# Patient Record
Sex: FEMALE | URBAN - METROPOLITAN AREA
[De-identification: names, ages, dates, MRNs, and addresses within clinical notes are randomized per-mention and may not be internally consistent; named-entity substitution may affect disease eponyms.]

---

## 2023-02-01 ENCOUNTER — LAB OUTSIDE AN ENCOUNTER (OUTPATIENT)
Dept: URBAN - METROPOLITAN AREA CLINIC 72 | Facility: CLINIC | Age: 78
End: 2023-02-01

## 2023-02-01 ENCOUNTER — WEB ENCOUNTER (OUTPATIENT)
Dept: URBAN - METROPOLITAN AREA CLINIC 72 | Facility: CLINIC | Age: 78
End: 2023-02-01

## 2023-02-01 ENCOUNTER — OFFICE VISIT (OUTPATIENT)
Dept: URBAN - METROPOLITAN AREA CLINIC 72 | Facility: CLINIC | Age: 78
End: 2023-02-01
Payer: MEDICARE

## 2023-02-01 ENCOUNTER — DASHBOARD ENCOUNTERS (OUTPATIENT)
Age: 78
End: 2023-02-01

## 2023-02-01 VITALS
SYSTOLIC BLOOD PRESSURE: 141 MMHG | BODY MASS INDEX: 31.58 KG/M2 | DIASTOLIC BLOOD PRESSURE: 78 MMHG | HEART RATE: 71 BPM | WEIGHT: 185 LBS | HEIGHT: 64 IN | TEMPERATURE: 97.9 F

## 2023-02-01 DIAGNOSIS — Z86.010 HISTORY OF COLON POLYPS: ICD-10-CM

## 2023-02-01 DIAGNOSIS — K57.90 DIVERTICULOSIS: ICD-10-CM

## 2023-02-01 DIAGNOSIS — K59.00 CONSTIPATION, UNSPECIFIED CONSTIPATION TYPE: ICD-10-CM

## 2023-02-01 PROBLEM — 398050005 DIVERTICULAR DISEASE OF COLON: Status: ACTIVE | Noted: 2023-02-01

## 2023-02-01 PROBLEM — 14760008: Status: ACTIVE | Noted: 2023-02-01

## 2023-02-01 PROCEDURE — 99203 OFFICE O/P NEW LOW 30 MIN: CPT | Performed by: INTERNAL MEDICINE

## 2023-02-01 RX ORDER — BLOOD SUGAR DIAGNOSTIC
USE TO CHECK BLOOD SUGAR ONCE DAILY STRIP MISCELLANEOUS
Qty: 50 EACH | Refills: 3 | Status: ACTIVE | COMMUNITY

## 2023-02-01 RX ORDER — LOSARTAN POTASSIUM 50 MG/1
TABLET, FILM COATED ORAL
Qty: 90 TABLET | Status: ACTIVE | COMMUNITY

## 2023-02-01 RX ORDER — FLUTICASONE PROPIONATE 50 UG/1
SPRAY, METERED NASAL
Qty: 48 GRAM | Status: ACTIVE | COMMUNITY

## 2023-02-01 RX ORDER — BLOOD-GLUCOSE METER
USE TO CHECK BLOOD SUGAR ONCE DAILY EACH MISCELLANEOUS
Qty: 1 EACH | Refills: 0 | Status: ACTIVE | COMMUNITY

## 2023-02-01 RX ORDER — OMEPRAZOLE 20 MG/1
CAPSULE, DELAYED RELEASE ORAL
Qty: 90 CAPSULE | Status: ACTIVE | COMMUNITY

## 2023-02-01 RX ORDER — POLYETHYLENE GLYCOL 3350 17 G/17G
1 PACKET MIXED WITH 8 OUNCES OF FLUID POWDER, FOR SOLUTION ORAL ONCE A DAY
Qty: 30 | OUTPATIENT
Start: 2023-02-01 | End: 2023-03-03

## 2023-02-01 RX ORDER — METFORMIN HYDROCHLORIDE 500 MG/1
TABLET, FILM COATED ORAL
Qty: 180 TABLET | Status: ACTIVE | COMMUNITY

## 2023-02-01 RX ORDER — LANCETS 33 GAUGE
USE TO CHECK BLOOD SUGAR DAILY EACH MISCELLANEOUS
Qty: 100 EACH | Refills: 0 | Status: ACTIVE | COMMUNITY

## 2023-02-01 RX ORDER — MOMETASONE FUROATE 50 UG/1
SPRAY, METERED NASAL
Qty: 51 GRAM | Status: ON HOLD | COMMUNITY

## 2023-02-01 NOTE — HPI-TODAY'S VISIT:
Mrs. Solano is a pleasant 77-year-old female presents as a new patient for consultation for colonoscopy due to history of colon polyps.  She was referred by Dr. Napoleon Ray, copy of this consultation will be forwarded to the referring physician.  She had colonoscopy 3/11/2020 with incomplete bowel prep repeat colonoscopy recommended 2023.  She had a 5 mm rectal polyp which was removed via snare, diverticulosis and external hemorrhoids.  In review of hospital records available, does not appear that any pathology was resulted from the colon polyp that was removed.  Her prep was suboptimal and a repeat was recommended in 2 to 3 years.  She suffers from chronic constipation.  She does fiber and prune juice with some improvement.  She reports excessive straining when she does have to go.  She has been told to take MiraLAX daily but does not take this.

## 2023-02-16 PROBLEM — 428283002 HISTORY OF POLYP OF COLON: Status: ACTIVE | Noted: 2023-02-01

## 2023-04-03 ENCOUNTER — TELEPHONE ENCOUNTER (OUTPATIENT)
Dept: URBAN - METROPOLITAN AREA CLINIC 72 | Facility: CLINIC | Age: 78
End: 2023-04-03

## 2023-04-03 ENCOUNTER — OFFICE VISIT (OUTPATIENT)
Dept: URBAN - METROPOLITAN AREA MEDICAL CENTER 40 | Facility: MEDICAL CENTER | Age: 78
End: 2023-04-03
Payer: MEDICARE

## 2023-04-03 DIAGNOSIS — D12.4 ADENOMA OF DESCENDING COLON: ICD-10-CM

## 2023-04-03 DIAGNOSIS — Z86.010 HISTORY OF COLON POLYPS: ICD-10-CM

## 2023-04-03 PROCEDURE — 45385 COLONOSCOPY W/LESION REMOVAL: CPT | Performed by: INTERNAL MEDICINE

## 2023-04-03 RX ORDER — METFORMIN HYDROCHLORIDE 500 MG/1
TABLET, FILM COATED ORAL
Qty: 180 TABLET | Status: ACTIVE | COMMUNITY

## 2023-04-03 RX ORDER — OMEPRAZOLE 20 MG/1
CAPSULE, DELAYED RELEASE ORAL
Qty: 90 CAPSULE | Status: ACTIVE | COMMUNITY

## 2023-04-03 RX ORDER — BLOOD SUGAR DIAGNOSTIC
USE TO CHECK BLOOD SUGAR ONCE DAILY STRIP MISCELLANEOUS
Qty: 50 EACH | Refills: 3 | Status: ACTIVE | COMMUNITY

## 2023-04-03 RX ORDER — LANCETS 33 GAUGE
USE TO CHECK BLOOD SUGAR DAILY EACH MISCELLANEOUS
Qty: 100 EACH | Refills: 0 | Status: ACTIVE | COMMUNITY

## 2023-04-03 RX ORDER — LOSARTAN POTASSIUM 50 MG/1
TABLET, FILM COATED ORAL
Qty: 90 TABLET | Status: ACTIVE | COMMUNITY

## 2023-04-03 RX ORDER — BLOOD-GLUCOSE METER
USE TO CHECK BLOOD SUGAR ONCE DAILY EACH MISCELLANEOUS
Qty: 1 EACH | Refills: 0 | Status: ACTIVE | COMMUNITY

## 2023-04-03 RX ORDER — FLUTICASONE PROPIONATE 50 UG/1
SPRAY, METERED NASAL
Qty: 48 GRAM | Status: ACTIVE | COMMUNITY

## 2023-04-03 RX ORDER — MOMETASONE FUROATE 50 UG/1
SPRAY, METERED NASAL
Qty: 51 GRAM | Status: ON HOLD | COMMUNITY

## 2023-04-04 ENCOUNTER — TELEPHONE ENCOUNTER (OUTPATIENT)
Dept: URBAN - METROPOLITAN AREA CLINIC 72 | Facility: CLINIC | Age: 78
End: 2023-04-04

## 2023-04-07 ENCOUNTER — OFFICE VISIT (OUTPATIENT)
Dept: URBAN - METROPOLITAN AREA MEDICAL CENTER 40 | Facility: MEDICAL CENTER | Age: 78
End: 2023-04-07
Payer: MEDICARE

## 2023-04-07 DIAGNOSIS — Z86.010 ADENOMAS PERSONAL HISTORY OF COLONIC POLYPS: ICD-10-CM

## 2023-04-07 DIAGNOSIS — Z09 CARDIOLOGY FOLLOW-UP ENCOUNTER: ICD-10-CM

## 2023-04-07 PROCEDURE — G0105 COLORECTAL SCRN; HI RISK IND: HCPCS | Performed by: INTERNAL MEDICINE

## 2025-02-12 ENCOUNTER — LAB OUTSIDE AN ENCOUNTER (OUTPATIENT)
Dept: URBAN - METROPOLITAN AREA CLINIC 113 | Facility: CLINIC | Age: 80
End: 2025-02-12

## 2025-02-12 ENCOUNTER — TELEPHONE ENCOUNTER (OUTPATIENT)
Dept: URBAN - METROPOLITAN AREA CLINIC 113 | Facility: CLINIC | Age: 80
End: 2025-02-12

## 2025-02-17 ENCOUNTER — OFFICE VISIT (OUTPATIENT)
Dept: URBAN - METROPOLITAN AREA MEDICAL CENTER 19 | Facility: MEDICAL CENTER | Age: 80
End: 2025-02-17
Payer: MEDICARE

## 2025-02-17 ENCOUNTER — TELEPHONE ENCOUNTER (OUTPATIENT)
Dept: URBAN - METROPOLITAN AREA CLINIC 113 | Facility: CLINIC | Age: 80
End: 2025-02-17

## 2025-02-17 DIAGNOSIS — K31.89 OTHER DISEASES OF STOMACH AND DUODENUM: ICD-10-CM

## 2025-02-17 PROCEDURE — 43242 EGD US FINE NEEDLE BX/ASPIR: CPT | Performed by: INTERNAL MEDICINE

## 2025-02-17 RX ORDER — METFORMIN HYDROCHLORIDE 500 MG/1
TABLET, FILM COATED ORAL
Qty: 180 TABLET | Status: ACTIVE | COMMUNITY

## 2025-02-17 RX ORDER — BLOOD SUGAR DIAGNOSTIC
USE TO CHECK BLOOD SUGAR ONCE DAILY STRIP MISCELLANEOUS
Qty: 50 EACH | Refills: 3 | Status: ACTIVE | COMMUNITY

## 2025-02-17 RX ORDER — LANCETS 33 GAUGE
USE TO CHECK BLOOD SUGAR DAILY EACH MISCELLANEOUS
Qty: 100 EACH | Refills: 0 | Status: ACTIVE | COMMUNITY

## 2025-02-17 RX ORDER — LOSARTAN POTASSIUM 50 MG/1
TABLET, FILM COATED ORAL
Qty: 90 TABLET | Status: ACTIVE | COMMUNITY

## 2025-02-17 RX ORDER — OMEPRAZOLE 20 MG/1
CAPSULE, DELAYED RELEASE ORAL
Qty: 90 CAPSULE | Status: ACTIVE | COMMUNITY

## 2025-02-17 RX ORDER — BLOOD-GLUCOSE METER
USE TO CHECK BLOOD SUGAR ONCE DAILY EACH MISCELLANEOUS
Qty: 1 EACH | Refills: 0 | Status: ACTIVE | COMMUNITY

## 2025-02-17 RX ORDER — MOMETASONE FUROATE 50 UG/1
SPRAY, METERED NASAL
Qty: 51 GRAM | Status: ON HOLD | COMMUNITY

## 2025-02-17 RX ORDER — FLUTICASONE PROPIONATE 50 UG/1
SPRAY, METERED NASAL
Qty: 48 GRAM | Status: ACTIVE | COMMUNITY

## 2025-02-24 ENCOUNTER — TELEPHONE ENCOUNTER (OUTPATIENT)
Dept: URBAN - METROPOLITAN AREA CLINIC 113 | Facility: CLINIC | Age: 80
End: 2025-02-24

## 2025-05-09 ENCOUNTER — P2P PATIENT RECORD (OUTPATIENT)
Age: 80
End: 2025-05-09

## 2025-05-21 ENCOUNTER — OFFICE VISIT (OUTPATIENT)
Dept: URBAN - METROPOLITAN AREA CLINIC 72 | Facility: CLINIC | Age: 80
End: 2025-05-21
Payer: MEDICARE

## 2025-05-21 DIAGNOSIS — K31.89 GASTRIC NODULE: ICD-10-CM

## 2025-05-21 DIAGNOSIS — K21.9 CHRONIC GERD: ICD-10-CM

## 2025-05-21 DIAGNOSIS — K44.9 ESOPHAGEAL HIATAL HERNIA: ICD-10-CM

## 2025-05-21 PROCEDURE — 99214 OFFICE O/P EST MOD 30 MIN: CPT | Performed by: INTERNAL MEDICINE

## 2025-05-21 RX ORDER — PANTOPRAZOLE SODIUM 40 MG/1
1 PACKET 1/2 TO 1 HOUR BEFORE MORNING MEAL MIXED WITH APPLE JUICE OR APPLESAUCE FOR SUSPENSION ORAL ONCE A DAY
Status: ACTIVE | COMMUNITY

## 2025-05-21 RX ORDER — BLOOD SUGAR DIAGNOSTIC
USE TO CHECK BLOOD SUGAR ONCE DAILY STRIP MISCELLANEOUS
Qty: 50 EACH | Refills: 3 | Status: ACTIVE | COMMUNITY

## 2025-05-21 RX ORDER — OMEPRAZOLE 20 MG/1
CAPSULE, DELAYED RELEASE ORAL
Qty: 90 CAPSULE | Status: ON HOLD | COMMUNITY

## 2025-05-21 RX ORDER — LANCETS 33 GAUGE
USE TO CHECK BLOOD SUGAR DAILY EACH MISCELLANEOUS
Qty: 100 EACH | Refills: 0 | Status: ACTIVE | COMMUNITY

## 2025-05-21 RX ORDER — CETIRIZINE HYDROCHLORIDE 10 MG/1
1 TABLET TABLET, FILM COATED ORAL ONCE A DAY
Status: ACTIVE | COMMUNITY

## 2025-05-21 RX ORDER — MOMETASONE FUROATE 50 UG/1
SPRAY, METERED NASAL
Qty: 51 GRAM | Status: ON HOLD | COMMUNITY

## 2025-05-21 RX ORDER — LOSARTAN POTASSIUM 50 MG/1
TABLET, FILM COATED ORAL
Qty: 90 TABLET | Status: ACTIVE | COMMUNITY

## 2025-05-21 RX ORDER — SUCRALFATE 1 G/10ML
10 ML 1 HOUR BEFORE MEALS AND AT BEDTIME ON AN EMPTY STOMACH SUSPENSION ORAL
Status: ACTIVE | COMMUNITY

## 2025-05-21 RX ORDER — FLUTICASONE PROPIONATE 50 UG/1
SPRAY, METERED NASAL
Qty: 48 GRAM | Status: ACTIVE | COMMUNITY

## 2025-05-21 RX ORDER — BLOOD-GLUCOSE METER
USE TO CHECK BLOOD SUGAR ONCE DAILY EACH MISCELLANEOUS
Qty: 1 EACH | Refills: 0 | Status: ACTIVE | COMMUNITY

## 2025-05-21 RX ORDER — FAMOTIDINE 20 MG/1
1 TABLET AT BEDTIME AS NEEDED TABLET, FILM COATED ORAL ONCE A DAY
Status: ACTIVE | COMMUNITY

## 2025-05-21 RX ORDER — METFORMIN HYDROCHLORIDE 500 MG/1
TABLET, FILM COATED ORAL
Qty: 180 TABLET | Status: ACTIVE | COMMUNITY

## 2025-05-21 NOTE — EXAM-PHYSICAL EXAM
General- no acute distress, resting comfortably Eyes- anicteric, no pallor HENT- normocephalic, atraumatic head Neck- no lymphadenopathy, symmetric Chest- non labored breathing, equal rise Abdomen- soft, non tender, non distended, no organomegaly Ext: LYNDSAY, no obvious sores or rashes

## 2025-05-21 NOTE — HPI-TODAY'S VISIT:
Mrs. Solano returns for follow-up.  Recall she is an 80-year-old last seen in office on 2/1/2023.  She did undergo an EUS 2/17/2025 by Dr. Swanson.  She was referred by [facility] Dr. Hughes for endoscopic ultrasound of gastric nodule.  She was referred back to us by Dr. Nikolay Gonzalez for refractory reflux.  A copy this consultation will be forwarded to the referring physician.  Recall she has a history of colon polyps and constipation.  procedure history: Colonoscopy for 7/20/2023 completed through the cecum with fair bowel preparation was grossly unremarkable with no polyps identified.  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  Biopsy showed mild inactive gastritis in the fundic gland polyp EUS 2/17/2025 showed 4 cm hiatal hernia small submucosal mass within the cardia within the hiatal hernia that was hypoechoic appeared to originate from the muscularis propria with possible invasion biopsy performed.  Felt to be suggestive of leiomyoma.  However patient did not tolerate excessive biopsying due to anesthetic issues.  Pathology returned showing pathology favoring leiomyoma

## 2025-07-09 ENCOUNTER — OFFICE VISIT (OUTPATIENT)
Dept: URBAN - METROPOLITAN AREA CLINIC 72 | Facility: CLINIC | Age: 80
End: 2025-07-09

## 2025-07-23 ENCOUNTER — OFFICE VISIT (OUTPATIENT)
Dept: URBAN - METROPOLITAN AREA CLINIC 72 | Facility: CLINIC | Age: 80
End: 2025-07-23
Payer: MEDICARE

## 2025-07-23 DIAGNOSIS — K21.9 CHRONIC GERD: ICD-10-CM

## 2025-07-23 DIAGNOSIS — K31.89 GASTRIC NODULE: ICD-10-CM

## 2025-07-23 DIAGNOSIS — K44.9 ESOPHAGEAL HIATAL HERNIA: ICD-10-CM

## 2025-07-23 PROCEDURE — 99214 OFFICE O/P EST MOD 30 MIN: CPT | Performed by: INTERNAL MEDICINE

## 2025-07-23 RX ORDER — BLOOD SUGAR DIAGNOSTIC
USE TO CHECK BLOOD SUGAR ONCE DAILY STRIP MISCELLANEOUS
Qty: 50 EACH | Refills: 3 | Status: ON HOLD | COMMUNITY

## 2025-07-23 RX ORDER — PANTOPRAZOLE SODIUM 40 MG/1
1 PACKET 1/2 TO 1 HOUR BEFORE MORNING MEAL MIXED WITH APPLE JUICE OR APPLESAUCE FOR SUSPENSION ORAL ONCE A DAY
Status: ACTIVE | COMMUNITY

## 2025-07-23 RX ORDER — FLUTICASONE PROPIONATE 50 UG/1
SPRAY, METERED NASAL
Qty: 48 GRAM | Status: ACTIVE | COMMUNITY

## 2025-07-23 RX ORDER — METFORMIN HYDROCHLORIDE 500 MG/1
TABLET, FILM COATED ORAL
Qty: 180 TABLET | Status: ACTIVE | COMMUNITY

## 2025-07-23 RX ORDER — LANCETS 33 GAUGE
USE TO CHECK BLOOD SUGAR DAILY EACH MISCELLANEOUS
Qty: 100 EACH | Refills: 0 | Status: ON HOLD | COMMUNITY

## 2025-07-23 RX ORDER — MOMETASONE FUROATE 50 UG/1
SPRAY, METERED NASAL
Qty: 51 GRAM | Status: ON HOLD | COMMUNITY

## 2025-07-23 RX ORDER — SUCRALFATE 1 G/10ML
10 ML 1 HOUR BEFORE MEALS AND AT BEDTIME ON AN EMPTY STOMACH SUSPENSION ORAL
Status: ON HOLD | COMMUNITY

## 2025-07-23 RX ORDER — PANTOPRAZOLE SODIUM 40 MG/1
1 TABLET 1/2 TO 1 HOUR BEFORE MORNING MEAL TABLET, DELAYED RELEASE ORAL ONCE A DAY
Qty: 90 TABLET | Refills: 3 | OUTPATIENT
Start: 2025-07-23

## 2025-07-23 RX ORDER — CETIRIZINE HYDROCHLORIDE 10 MG/1
1 TABLET TABLET, FILM COATED ORAL ONCE A DAY
Status: ON HOLD | COMMUNITY

## 2025-07-23 RX ORDER — BLOOD-GLUCOSE METER
USE TO CHECK BLOOD SUGAR ONCE DAILY EACH MISCELLANEOUS
Qty: 1 EACH | Refills: 0 | Status: ON HOLD | COMMUNITY

## 2025-07-23 RX ORDER — MEMANTINE HYDROCHLORIDE 10 MG/1
1 TABLET TABLET ORAL ONCE A DAY
Status: ACTIVE | COMMUNITY

## 2025-07-23 RX ORDER — OMEPRAZOLE 20 MG/1
CAPSULE, DELAYED RELEASE ORAL
Qty: 90 CAPSULE | Status: ON HOLD | COMMUNITY

## 2025-07-23 RX ORDER — PANTOPRAZOLE SODIUM 40 MG/1
1 PACKET 1/2 TO 1 HOUR BEFORE MORNING MEAL MIXED WITH APPLE JUICE OR APPLESAUCE FOR SUSPENSION ORAL ONCE A DAY
Qty: 90 | Refills: 3

## 2025-07-23 RX ORDER — FAMOTIDINE 20 MG/1
TAKE 1 TABLET TABLET, FILM COATED ORAL TWICE A DAY
Qty: 180 | Refills: 3

## 2025-07-23 RX ORDER — LOSARTAN POTASSIUM 50 MG/1
TABLET, FILM COATED ORAL
Qty: 90 TABLET | Status: ON HOLD | COMMUNITY

## 2025-07-23 RX ORDER — FAMOTIDINE 20 MG/1
1 TABLET AT BEDTIME AS NEEDED TABLET, FILM COATED ORAL ONCE A DAY
Status: ACTIVE | COMMUNITY

## 2025-07-24 ENCOUNTER — TELEPHONE ENCOUNTER (OUTPATIENT)
Dept: URBAN - METROPOLITAN AREA CLINIC 113 | Facility: CLINIC | Age: 80
End: 2025-07-24

## 2025-07-24 RX ORDER — PANTOPRAZOLE SODIUM 40 MG/1
1 PACKET 1/2 TO 1 HOUR BEFORE MORNING MEAL MIXED WITH APPLE JUICE OR APPLESAUCE TABLET, DELAYED RELEASE ORAL ONCE A DAY
Qty: 90 | Refills: 3

## 2025-07-24 RX ORDER — FAMOTIDINE 20 MG/1
TAKE 1 TABLET TABLET, FILM COATED ORAL TWICE A DAY
Qty: 180 | Refills: 3